# Patient Record
Sex: FEMALE | Race: WHITE | NOT HISPANIC OR LATINO | Employment: FULL TIME | RURAL
[De-identification: names, ages, dates, MRNs, and addresses within clinical notes are randomized per-mention and may not be internally consistent; named-entity substitution may affect disease eponyms.]

---

## 2019-07-11 ENCOUNTER — OFFICE VISIT (OUTPATIENT)
Dept: FAMILY MEDICINE CLINIC | Facility: CLINIC | Age: 34
End: 2019-07-11

## 2019-07-11 VITALS
HEART RATE: 89 BPM | WEIGHT: 258.4 LBS | TEMPERATURE: 97.6 F | BODY MASS INDEX: 41.53 KG/M2 | RESPIRATION RATE: 18 BRPM | OXYGEN SATURATION: 96 % | HEIGHT: 66 IN

## 2019-07-11 DIAGNOSIS — N63.0 BREAST MASS: Primary | ICD-10-CM

## 2019-07-11 PROCEDURE — 99213 OFFICE O/P EST LOW 20 MIN: CPT | Performed by: FAMILY MEDICINE

## 2019-07-11 NOTE — PROGRESS NOTES
Subjective   Jessica Huerta is a 33 y.o. female.     Jessica noticed breast was harder than normal on left breast. Reports no pain. Starting monthly cycle next week. History of right breast lumpectomy for benign lump. Pt anxious about Breast Cancer      Breast Mass   This is a new problem. The current episode started yesterday.        The following portions of the patient's history were reviewed and updated as appropriate: allergies, current medications, past family history, past medical history, past social history, past surgical history and problem list.    Patient Active Problem List   Diagnosis   • Breast mass       No current outpatient medications on file prior to visit.     No current facility-administered medications on file prior to visit.        Allergies   Allergen Reactions   • Penicillins Hives   • Sulfa Antibiotics Hives       Review of Systems   Genitourinary: Positive for breast lump. Negative for breast discharge and breast pain.   Skin: Negative for color change and skin lesions.       Objective   Physical Exam   Constitutional: She is oriented to person, place, and time. She appears well-developed and well-nourished.   HENT:   Head: Normocephalic and atraumatic.   Left Ear: External ear normal.   Nose: Nose normal.   Mouth/Throat: Oropharynx is clear and moist.   Eyes: EOM are normal. Pupils are equal, round, and reactive to light.   Neck: Normal range of motion. Neck supple.   Cardiovascular: Normal rate, regular rhythm and normal heart sounds.   Pulmonary/Chest: Effort normal. Left breast exhibits mass.   approx 1 cm likely cystic mass left breast at 9 o'clock. Exam suggestive of fibrocystic dz.   Abdominal: Soft. Bowel sounds are normal.   Musculoskeletal: Normal range of motion.   Neurological: She is alert and oriented to person, place, and time.   Skin: Skin is warm and dry.   Psychiatric: She has a normal mood and affect. Her behavior is normal. Judgment and thought content normal.          Assessment/Plan .  Problem List Items Addressed This Visit     Breast mass - Primary    Overview     Right 2000         Relevant Orders    Mammo Diagnostic Bilateral With CAD    US Breast Left Limited        Findings discussed. All questions answered.  Follow-up after testing complete, sooner for worsening symptoms or any concerns

## 2019-07-31 ENCOUNTER — HOSPITAL ENCOUNTER (OUTPATIENT)
Dept: ULTRASOUND IMAGING | Facility: HOSPITAL | Age: 34
Discharge: HOME | End: 2019-07-31

## 2019-07-31 ENCOUNTER — HOSPITAL ENCOUNTER (OUTPATIENT)
Dept: MAMMOGRAPHY | Facility: HOSPITAL | Age: 34
Discharge: HOME | End: 2019-07-31
Admitting: FAMILY MEDICINE

## 2019-07-31 DIAGNOSIS — N63.0 BREAST MASS: ICD-10-CM

## 2019-07-31 PROCEDURE — 77066 DX MAMMO INCL CAD BI: CPT

## 2019-07-31 PROCEDURE — G0279 TOMOSYNTHESIS, MAMMO: HCPCS

## 2019-07-31 PROCEDURE — 76642 ULTRASOUND BREAST LIMITED: CPT

## 2019-08-01 ENCOUNTER — TELEPHONE (OUTPATIENT)
Dept: FAMILY MEDICINE CLINIC | Facility: CLINIC | Age: 34
End: 2019-08-01

## 2019-08-01 NOTE — TELEPHONE ENCOUNTER
----- Message from Danyel Whalen MD sent at 8/1/2019  6:25 AM EDT -----  Please notify patient that breast ultrasound was normal

## 2020-02-26 ENCOUNTER — TELEPHONE (OUTPATIENT)
Dept: FAMILY MEDICINE CLINIC | Facility: CLINIC | Age: 35
End: 2020-02-26

## 2023-09-27 NOTE — PROGRESS NOTES
Office Note     Name: Jessica Huerta    : 1985     MRN: [id number]     Chief Complaint  Establish Care    Subjective     History of Present Illness:  Jessica Huerta is a 38 y.o. female who presents today for establish care.       The patient is here: for coordination of medical care to discuss health maintenance and disease prevention.  Last comprehensive physical was on 10+ years ago.  Patient's previous physician was Dr. Whalen .    Overall has: moderate activity with work/home activities, good appetite, feels well with minor complaints, good energy level, and is sleeping well. Nutrition: inappropriate diet.    Sleep:well.  Hours of Sleep: 8  Dental care- has not been in about 3 years  - Appt next month Dr. Gomez  Ophthalmology care- none  Never been to an ob-gyn.    She has had a pap. No abnormals.     [employer] from [place] -   Tart Ng at night to sleep  Adrenal gland meds  [employer] energy but non caffeine - Strawberry based extract.  [place] through [employer].     [facility] - [employer].     Fifteen years.   Desires pregnancy   - no children.   is an alcoholic and trying to recover.  She agreed to go forward with trying to conceive.    Never checked out.   They have been together twenty-five years.   Functioning alcoholic     Specialty care- none   Last tetanus shot was 5-10 years ago. Patient is doing routine self breast exams: monthly.    Contraceptive History:none  Libido:good  Self Skin Exam: occasionally  Monthly Breast Self Exam:Performs monthly self breast exam    Pap Smear: 15+ years     Mammogram:  was done on approximately 2019 and the result was: 2 cysts on breasts. No follow up. .    Bone Dexa scan: None    Colonoscopy:   None     Recent Hospitalizations:  No hospitalization(s) within the last year..      Recent lab results if available were discussed as well as medications reconciled.  Genetic screening for cancers and conditions discussed if applicable.   Patient agrees with plan of care and will return as needed.  All questions answered patient agrees with plan of care.  Patient agrees to return in the near future for annual physical exam.     History of Present Illness        Discussed injury prevention, diet and exercise, safe sexual practices, and screening for common diseases. Encouraged use of sunscreen and seatbelts. Discussed timing of  cervical cancer screening. Encouraged monthly self-breast exams, yearly clinical breast exams, and discussed timing of mammograms. Avoidance of tobacco encouraged. Limitation or avoidance of alcohol encouraged. Anticipatory guidance given and routine screenings recommended with recommendations of yearly dental and eye exams., monitoring of blood pressure, lipids, and screening for colon and lung cancer risks.        Allergies   Allergen Reactions    Penicillin G Hives    Penicillins Hives    Sulfa Antibiotics Hives         Current Outpatient Medications:     Elderberry 500 MG capsule, Take  by mouth., Disp: , Rfl:     Past Medical History:   Diagnosis Date    Bilateral breast cysts        Review of Systems   Constitutional:  Negative for chills, fatigue and fever.   HENT: Negative.     Respiratory:  Negative for cough, shortness of breath and wheezing.    Cardiovascular: Negative.    Gastrointestinal:  Positive for constipation and GERD. Negative for nausea.   Genitourinary:  Negative for breast discharge, breast lump, breast pain, decreased libido, dysuria, flank pain, vaginal bleeding, vaginal discharge and vaginal pain.        Does not use pads/tampons/or the cup.  Reaction in March/April 2023. Thinks she had toxic shock syndrome.   Placed tampon in, 30 minutes uncontrollable shaking/chills.   Could not control the shaking.  Removed tampon.  Drank water  Chilled/tremoring  Felt dizzy/lightheaded/tired.  X 4 days.     Musculoskeletal: Negative.    Skin: Negative.    Allergic/Immunologic: Positive for environmental allergies.  "Negative for food allergies.   Neurological:  Positive for light-headedness (during menses) and headache. Negative for dizziness, weakness and confusion.   Hematological:  Does not bruise/bleed easily.   Psychiatric/Behavioral:  Negative for self-injury, sleep disturbance, suicidal ideas, depressed mood and stress. The patient is not nervous/anxious.      Social History     Socioeconomic History    Marital status:    Tobacco Use    Smoking status: Never    Smokeless tobacco: Never   Vaping Use    Vaping Use: Never used   Substance and Sexual Activity    Alcohol use: Yes    Drug use: No       Family History   Problem Relation Age of Onset    Diabetes Mother     Diabetes Father     Diabetes Maternal Grandmother     Diabetes Maternal Grandfather      LMP regular last week         9/29/2023     3:14 PM   PHQ-2/PHQ-9 Depression Screening   Little Interest or Pleasure in Doing Things 0-->not at all   Feeling Down, Depressed or Hopeless 0-->not at all   PHQ-9: Brief Depression Severity Measure Score 0       Fall Risk Screen:  LUNA Fall Risk Assessment has not been completed.      Objective     /98 (BP Location: Right arm, Patient Position: Sitting, Cuff Size: Adult)   Pulse 56   Resp 18   Ht 170.2 cm (67\")   Wt 123 kg (271 lb)   SpO2 97%   BMI 42.44 kg/m²     BP Readings from Last 2 Encounters:   09/29/23 172/98   01/13/17 140/87       Wt Readings from Last 2 Encounters:   09/29/23 123 kg (271 lb)   07/11/19 117 kg (258 lb 6.4 oz)       Class 3 Severe Obesity (BMI >=40). Obesity-related health conditions include the following: GERD. Obesity is newly identified. BMI is is above average; BMI management plan is completed. We discussed low calorie, low carb based diet program, portion control, increasing exercise, and an clay-based approach such as Rysto Pal or Lose It.         Physical Exam  Vitals and nursing note reviewed.   Constitutional:       General: She is not in acute distress.     Appearance: " Normal appearance. She is well-groomed. She is obese. She is not ill-appearing, toxic-appearing or diaphoretic.   HENT:      Head: Normocephalic and atraumatic.   Eyes:      General: Vision grossly intact.   Neck:      Vascular: No carotid bruit.   Cardiovascular:      Rate and Rhythm: Normal rate and regular rhythm.      Heart sounds: Normal heart sounds. No murmur heard.  Pulmonary:      Effort: Pulmonary effort is normal.      Breath sounds: Normal breath sounds and air entry.   Musculoskeletal:      Right lower leg: No edema.      Left lower leg: No edema.   Skin:     General: Skin is warm and dry.   Neurological:      Mental Status: She is alert and oriented to person, place, and time. Mental status is at baseline.   Psychiatric:         Attention and Perception: Attention normal.         Mood and Affect: Mood and affect normal.         Behavior: Behavior normal. Behavior is cooperative.         Thought Content: Thought content normal.     Derm Physical Exam  Result Review :                 Assessment and Plan     Procedures  Plan    Diagnoses and all orders for this visit:    1. Encounter to establish care (Primary)    2. Elevated blood pressure reading in office without diagnosis of hypertension  -     CBC & Differential  -     Comprehensive Metabolic Panel    3. Patient desires pregnancy  -     Ambulatory Referral to Obstetrics / Gynecology    4. Other hyperlipidemia  -     Lipid Panel With / Chol / HDL Ratio    5. Snores  Comments:  Offered sleep study.   Deferred at this time.   Will discuss at next visit.    6. Screening for lipid disorders    7. Screening for diabetes mellitus  -     Hemoglobin A1c    8. Screening for thyroid disorder  -     TSH    9. Encounter for vitamin deficiency screening  -     Calcitriol (1,25 di-OH Vitamin D)  -     Magnesium  -     Phosphorus  -     Vitamin B12  -     Vitamin B6    10. Encounter for hepatitis C screening test for low risk patient  -     Hepatitis C  Antibody    Advised patient vitamin D may or may not be covered by insurance. Patient reports understanding.       Problem List Items Addressed This Visit          Active Problems    Hyperlipidemia    Overview     Discussed diet, exercise, lifestyle modification. check fasting labs         Relevant Orders    Lipid Panel With / Chol / HDL Ratio     Other Visit Diagnoses       Encounter to establish care    -  Primary    Elevated blood pressure reading in office without diagnosis of hypertension        Relevant Orders    CBC & Differential    Comprehensive Metabolic Panel    Patient desires pregnancy        Relevant Orders    Ambulatory Referral to Obstetrics / Gynecology (Completed)    Snores        Offered sleep study.   Deferred at this time.   Will discuss at next visit.    Screening for lipid disorders        Screening for diabetes mellitus        Relevant Orders    Hemoglobin A1c    Screening for thyroid disorder        Relevant Orders    TSH    Encounter for vitamin deficiency screening        Relevant Orders    Calcitriol (1,25 di-OH Vitamin D)    Magnesium    Phosphorus    Vitamin B12    Vitamin B6    Encounter for hepatitis C screening test for low risk patient        Relevant Orders    Hepatitis C Antibody             Follow Up   Wrapup Tab  Return for Annual physical.   Patient Instructions   Please have blood work completed.  Orders are at [facility].    Return for annual physical exam.  Continue plan of care as discussed.     Make appointment with [facility], Dr. Sally Gonsalez, [phone]. [email or web address]     Patient was given instructions and counseling regarding her condition or for health maintenance advice. Please see specific information pulled into the AVS if appropriate.  Hand hygiene was performed during entrance to exam room and following assessment of patient. This document is intended for medical expert use only.     EMR Dragon/Transcription disclaimer:   Much of this  encounter note is an electronic transcription/translation of spoken language to printed text. The electronic translation of spoken language may permit erroneous, or at times, nonsensical words or phrases to be inadvertently transcribed.      ANTONIA Welch, FNP-C  [facility] [address]  [place] [address] [phone]

## 2023-09-29 ENCOUNTER — OFFICE VISIT (OUTPATIENT)
Dept: FAMILY MEDICINE CLINIC | Facility: CLINIC | Age: 38
End: 2023-09-29
Payer: COMMERCIAL

## 2023-09-29 VITALS
OXYGEN SATURATION: 97 % | RESPIRATION RATE: 18 BRPM | HEART RATE: 56 BPM | HEIGHT: 67 IN | BODY MASS INDEX: 42.53 KG/M2 | DIASTOLIC BLOOD PRESSURE: 98 MMHG | WEIGHT: 271 LBS | SYSTOLIC BLOOD PRESSURE: 172 MMHG

## 2023-09-29 DIAGNOSIS — Z11.59 ENCOUNTER FOR HEPATITIS C SCREENING TEST FOR LOW RISK PATIENT: ICD-10-CM

## 2023-09-29 DIAGNOSIS — Z13.220 SCREENING FOR LIPID DISORDERS: ICD-10-CM

## 2023-09-29 DIAGNOSIS — E78.49 OTHER HYPERLIPIDEMIA: ICD-10-CM

## 2023-09-29 DIAGNOSIS — Z13.1 SCREENING FOR DIABETES MELLITUS: ICD-10-CM

## 2023-09-29 DIAGNOSIS — Z13.29 SCREENING FOR THYROID DISORDER: ICD-10-CM

## 2023-09-29 DIAGNOSIS — R03.0 ELEVATED BLOOD PRESSURE READING IN OFFICE WITHOUT DIAGNOSIS OF HYPERTENSION: ICD-10-CM

## 2023-09-29 DIAGNOSIS — Z76.89 ENCOUNTER TO ESTABLISH CARE: Primary | ICD-10-CM

## 2023-09-29 DIAGNOSIS — R06.83 SNORES: ICD-10-CM

## 2023-09-29 DIAGNOSIS — Z13.21 ENCOUNTER FOR VITAMIN DEFICIENCY SCREENING: ICD-10-CM

## 2023-09-29 DIAGNOSIS — Z31.9 PATIENT DESIRES PREGNANCY: ICD-10-CM

## 2023-09-29 PROBLEM — S82.899A ANKLE FRACTURE: Status: ACTIVE | Noted: 2023-09-29

## 2023-09-29 PROBLEM — E78.5 HYPERLIPIDEMIA: Status: ACTIVE | Noted: 2023-09-29

## 2023-09-29 PROBLEM — E66.9 OBESITY: Status: ACTIVE | Noted: 2023-09-29

## 2023-09-29 PROBLEM — R51.9 HEADACHE: Status: ACTIVE | Noted: 2023-09-29

## 2023-09-29 PROBLEM — N92.6 IRREGULAR MENSTRUAL CYCLE: Status: ACTIVE | Noted: 2023-09-29

## 2023-09-29 NOTE — PATIENT INSTRUCTIONS
Please have blood work completed.  Orders are at [facility].    Return for annual physical exam.  Continue plan of care as discussed.     Make appointment with [facility], Dr. Sally Gonsalez, [phone]. [email or web address]

## 2023-10-04 LAB
1,25(OH)2D SERPL-MCNC: 38.7 PG/ML (ref 24.8–81.5)
HBA1C MFR BLD: 5.6 % (ref 4.8–5.6)
MAGNESIUM SERPL-MCNC: 2 MG/DL (ref 1.6–2.3)
PHOSPHATE SERPL-MCNC: 4.1 MG/DL (ref 3–4.3)
PYRIDOXAL PHOS SERPL-MCNC: 4.7 UG/L (ref 3.4–65.2)
VIT B12 SERPL-MCNC: 279 PG/ML (ref 232–1245)

## 2023-12-29 DIAGNOSIS — E78.49 OTHER HYPERLIPIDEMIA: Primary | ICD-10-CM

## 2023-12-29 DIAGNOSIS — R03.0 ELEVATED BLOOD PRESSURE READING IN OFFICE WITHOUT DIAGNOSIS OF HYPERTENSION: ICD-10-CM

## 2023-12-29 DIAGNOSIS — Z13.29 SCREENING FOR THYROID DISORDER: ICD-10-CM

## 2023-12-29 DIAGNOSIS — Z11.59 ENCOUNTER FOR HEPATITIS C SCREENING TEST FOR LOW RISK PATIENT: ICD-10-CM

## 2024-02-07 ENCOUNTER — TELEPHONE (OUTPATIENT)
Dept: FAMILY MEDICINE CLINIC | Facility: CLINIC | Age: 39
End: 2024-02-07
Payer: COMMERCIAL